# Patient Record
Sex: FEMALE
[De-identification: names, ages, dates, MRNs, and addresses within clinical notes are randomized per-mention and may not be internally consistent; named-entity substitution may affect disease eponyms.]

---

## 2021-07-10 ENCOUNTER — HOSPITAL ENCOUNTER (INPATIENT)
Dept: HOSPITAL 8 - 5SO | Age: 66
LOS: 1 days | Discharge: HOME | DRG: 247 | End: 2021-07-11
Attending: STUDENT IN AN ORGANIZED HEALTH CARE EDUCATION/TRAINING PROGRAM | Admitting: INTERNAL MEDICINE
Payer: COMMERCIAL

## 2021-07-10 VITALS — DIASTOLIC BLOOD PRESSURE: 76 MMHG | SYSTOLIC BLOOD PRESSURE: 113 MMHG

## 2021-07-10 VITALS — SYSTOLIC BLOOD PRESSURE: 118 MMHG | DIASTOLIC BLOOD PRESSURE: 73 MMHG

## 2021-07-10 VITALS — DIASTOLIC BLOOD PRESSURE: 74 MMHG | SYSTOLIC BLOOD PRESSURE: 118 MMHG

## 2021-07-10 VITALS — SYSTOLIC BLOOD PRESSURE: 162 MMHG | DIASTOLIC BLOOD PRESSURE: 99 MMHG

## 2021-07-10 VITALS — HEIGHT: 61 IN | BODY MASS INDEX: 34.96 KG/M2 | WEIGHT: 185.19 LBS

## 2021-07-10 DIAGNOSIS — Z82.49: ICD-10-CM

## 2021-07-10 DIAGNOSIS — I10: ICD-10-CM

## 2021-07-10 DIAGNOSIS — Z95.5: ICD-10-CM

## 2021-07-10 DIAGNOSIS — I25.10: ICD-10-CM

## 2021-07-10 DIAGNOSIS — R55: ICD-10-CM

## 2021-07-10 DIAGNOSIS — Z87.891: ICD-10-CM

## 2021-07-10 DIAGNOSIS — I21.4: Primary | ICD-10-CM

## 2021-07-10 DIAGNOSIS — R00.1: ICD-10-CM

## 2021-07-10 DIAGNOSIS — E66.9: ICD-10-CM

## 2021-07-10 DIAGNOSIS — E78.5: ICD-10-CM

## 2021-07-10 LAB
ANION GAP SERPL CALC-SCNC: 4 MMOL/L (ref 5–15)
BASOPHILS # BLD AUTO: 0 X10^3/UL (ref 0–0.1)
BASOPHILS NFR BLD AUTO: 0 % (ref 0–1)
CALCIUM SERPL-MCNC: 8.6 MG/DL (ref 8.5–10.1)
CHLORIDE SERPL-SCNC: 116 MMOL/L (ref 98–107)
CHOL/HDL RATIO: 3.5
CREAT SERPL-MCNC: 0.82 MG/DL (ref 0.55–1.02)
EOSINOPHIL # BLD AUTO: 0.1 X10^3/UL (ref 0–0.4)
EOSINOPHIL NFR BLD AUTO: 1 % (ref 1–7)
ERYTHROCYTE [DISTWIDTH] IN BLOOD BY AUTOMATED COUNT: 14.8 % (ref 9.6–15.2)
HDL CHOL %: 29 % (ref 28–40)
HDL CHOLESTEROL (DIRECT): 56 MG/DL (ref 40–60)
LDL CHOLESTEROL,CALCULATED: 113 MG/DL (ref 54–169)
LDLC/HDLC SERPL: 2 {RATIO} (ref 0.5–3)
LYMPHOCYTES # BLD AUTO: 2.1 X10^3/UL (ref 1–3.4)
LYMPHOCYTES NFR BLD AUTO: 22 % (ref 22–44)
MCH RBC QN AUTO: 29.2 PG (ref 27–34.8)
MCHC RBC AUTO-ENTMCNC: 33.4 G/DL (ref 32.4–35.8)
MONOCYTES # BLD AUTO: 0.8 X10^3/UL (ref 0.2–0.8)
MONOCYTES NFR BLD AUTO: 8 % (ref 2–9)
NEUTROPHILS # BLD AUTO: 6.7 X10^3/UL (ref 1.8–6.8)
NEUTROPHILS NFR BLD AUTO: 69 % (ref 42–75)
PLATELET # BLD AUTO: 278 X10^3/UL (ref 130–400)
PMV BLD AUTO: 8.5 FL (ref 7.4–10.4)
RBC # BLD AUTO: 4.75 X10^6/UL (ref 3.82–5.3)
TRIGL SERPL-MCNC: 125 MG/DL (ref 50–200)
TROPONIN I SERPL-MCNC: 5.16 NG/ML (ref 0–0.04)
TROPONIN I SERPL-MCNC: 7.56 NG/ML (ref 0–0.04)
TROPONIN I SERPL-MCNC: 7.58 NG/ML (ref 0–0.04)
VLDLC SERPL CALC-MCNC: 25 MG/DL (ref 0–25)

## 2021-07-10 PROCEDURE — 93306 TTE W/DOPPLER COMPLETE: CPT

## 2021-07-10 PROCEDURE — 99157 MOD SED OTHER PHYS/QHP EA: CPT

## 2021-07-10 PROCEDURE — 80048 BASIC METABOLIC PNL TOTAL CA: CPT

## 2021-07-10 PROCEDURE — 36415 COLL VENOUS BLD VENIPUNCTURE: CPT

## 2021-07-10 PROCEDURE — 4A023N7 MEASUREMENT OF CARDIAC SAMPLING AND PRESSURE, LEFT HEART, PERCUTANEOUS APPROACH: ICD-10-PCS | Performed by: INTERNAL MEDICINE

## 2021-07-10 PROCEDURE — C1887 CATHETER, GUIDING: HCPCS

## 2021-07-10 PROCEDURE — 027034Z DILATION OF CORONARY ARTERY, ONE ARTERY WITH DRUG-ELUTING INTRALUMINAL DEVICE, PERCUTANEOUS APPROACH: ICD-10-PCS | Performed by: INTERNAL MEDICINE

## 2021-07-10 PROCEDURE — B2151ZZ FLUOROSCOPY OF LEFT HEART USING LOW OSMOLAR CONTRAST: ICD-10-PCS | Performed by: INTERNAL MEDICINE

## 2021-07-10 PROCEDURE — C1725 CATH, TRANSLUMIN NON-LASER: HCPCS

## 2021-07-10 PROCEDURE — 93356 MYOCRD STRAIN IMG SPCKL TRCK: CPT

## 2021-07-10 PROCEDURE — 85025 COMPLETE CBC W/AUTO DIFF WBC: CPT

## 2021-07-10 PROCEDURE — C1769 GUIDE WIRE: HCPCS

## 2021-07-10 PROCEDURE — 83735 ASSAY OF MAGNESIUM: CPT

## 2021-07-10 PROCEDURE — 99156 MOD SED OTH PHYS/QHP 5/>YRS: CPT

## 2021-07-10 PROCEDURE — 85520 HEPARIN ASSAY: CPT

## 2021-07-10 PROCEDURE — C1874 STENT, COATED/COV W/DEL SYS: HCPCS

## 2021-07-10 PROCEDURE — C1894 INTRO/SHEATH, NON-LASER: HCPCS

## 2021-07-10 PROCEDURE — 80061 LIPID PANEL: CPT

## 2021-07-10 PROCEDURE — C9600 PERC DRUG-EL COR STENT SING: HCPCS

## 2021-07-10 PROCEDURE — 93458 L HRT ARTERY/VENTRICLE ANGIO: CPT

## 2021-07-10 PROCEDURE — 84484 ASSAY OF TROPONIN QUANT: CPT

## 2021-07-10 PROCEDURE — B2111ZZ FLUOROSCOPY OF MULTIPLE CORONARY ARTERIES USING LOW OSMOLAR CONTRAST: ICD-10-PCS | Performed by: INTERNAL MEDICINE

## 2021-07-10 RX ADMIN — SODIUM CHLORIDE SCH MLS/HR: 0.9 INJECTION, SOLUTION INTRAVENOUS at 10:00

## 2021-07-10 RX ADMIN — SODIUM CHLORIDE SCH MLS/HR: 0.9 INJECTION, SOLUTION INTRAVENOUS at 19:38

## 2021-07-10 RX ADMIN — TICAGRELOR SCH MG: 90 TABLET ORAL at 20:46

## 2021-07-10 RX ADMIN — CARVEDILOL SCH MG: 3.12 TABLET, FILM COATED ORAL at 09:04

## 2021-07-10 RX ADMIN — CARVEDILOL SCH MG: 3.12 TABLET, FILM COATED ORAL at 17:15

## 2021-07-10 RX ADMIN — ASPIRIN 81 MG SCH MG: 81 TABLET ORAL at 09:04

## 2021-07-10 RX ADMIN — SODIUM CHLORIDE SCH MLS/HR: 0.9 INJECTION, SOLUTION INTRAVENOUS at 13:00

## 2021-07-10 RX ADMIN — LISINOPRIL SCH MG: 10 TABLET ORAL at 09:04

## 2021-07-11 VITALS — SYSTOLIC BLOOD PRESSURE: 127 MMHG | DIASTOLIC BLOOD PRESSURE: 68 MMHG

## 2021-07-11 VITALS — SYSTOLIC BLOOD PRESSURE: 129 MMHG | DIASTOLIC BLOOD PRESSURE: 72 MMHG

## 2021-07-11 VITALS — DIASTOLIC BLOOD PRESSURE: 68 MMHG | SYSTOLIC BLOOD PRESSURE: 129 MMHG

## 2021-07-11 VITALS — SYSTOLIC BLOOD PRESSURE: 137 MMHG | DIASTOLIC BLOOD PRESSURE: 91 MMHG

## 2021-07-11 LAB
ANION GAP SERPL CALC-SCNC: 4 MMOL/L (ref 5–15)
BASOPHILS # BLD AUTO: 0 X10^3/UL (ref 0–0.1)
BASOPHILS NFR BLD AUTO: 1 % (ref 0–1)
CALCIUM SERPL-MCNC: 8.5 MG/DL (ref 8.5–10.1)
CHLORIDE SERPL-SCNC: 113 MMOL/L (ref 98–107)
CREAT SERPL-MCNC: 0.75 MG/DL (ref 0.55–1.02)
EOSINOPHIL # BLD AUTO: 0.3 X10^3/UL (ref 0–0.4)
EOSINOPHIL NFR BLD AUTO: 4 % (ref 1–7)
ERYTHROCYTE [DISTWIDTH] IN BLOOD BY AUTOMATED COUNT: 15 % (ref 9.6–15.2)
LYMPHOCYTES # BLD AUTO: 1.6 X10^3/UL (ref 1–3.4)
LYMPHOCYTES NFR BLD AUTO: 25 % (ref 22–44)
MCH RBC QN AUTO: 29.8 PG (ref 27–34.8)
MCHC RBC AUTO-ENTMCNC: 34.1 G/DL (ref 32.4–35.8)
MONOCYTES # BLD AUTO: 0.5 X10^3/UL (ref 0.2–0.8)
MONOCYTES NFR BLD AUTO: 8 % (ref 2–9)
NEUTROPHILS # BLD AUTO: 4 X10^3/UL (ref 1.8–6.8)
NEUTROPHILS NFR BLD AUTO: 62 % (ref 42–75)
PLATELET # BLD AUTO: 233 X10^3/UL (ref 130–400)
PMV BLD AUTO: 8.4 FL (ref 7.4–10.4)
RBC # BLD AUTO: 4.25 X10^6/UL (ref 3.82–5.3)

## 2021-07-11 RX ADMIN — SODIUM CHLORIDE SCH MLS/HR: 0.9 INJECTION, SOLUTION INTRAVENOUS at 04:10

## 2021-07-11 RX ADMIN — LISINOPRIL SCH MG: 10 TABLET ORAL at 09:35

## 2021-07-11 RX ADMIN — CARVEDILOL SCH MG: 3.12 TABLET, FILM COATED ORAL at 05:25

## 2021-07-11 RX ADMIN — TICAGRELOR SCH MG: 90 TABLET ORAL at 09:00

## 2021-07-11 RX ADMIN — ASPIRIN 81 MG SCH MG: 81 TABLET ORAL at 09:35

## 2021-07-11 RX ADMIN — SODIUM CHLORIDE SCH MLS/HR: 0.9 INJECTION, SOLUTION INTRAVENOUS at 13:00

## 2023-12-13 ENCOUNTER — HOSPITAL ENCOUNTER (INPATIENT)
Facility: MEDICAL CENTER | Age: 68
LOS: 1 days | DRG: 310 | End: 2023-12-14
Attending: EMERGENCY MEDICINE | Admitting: INTERNAL MEDICINE
Payer: MEDICARE

## 2023-12-13 ENCOUNTER — APPOINTMENT (OUTPATIENT)
Dept: CARDIOLOGY | Facility: MEDICAL CENTER | Age: 68
DRG: 310 | End: 2023-12-13
Payer: MEDICARE

## 2023-12-13 ENCOUNTER — APPOINTMENT (OUTPATIENT)
Dept: RADIOLOGY | Facility: MEDICAL CENTER | Age: 68
DRG: 310 | End: 2023-12-13
Attending: EMERGENCY MEDICINE
Payer: MEDICARE

## 2023-12-13 DIAGNOSIS — I10 HYPERTENSION, UNSPECIFIED TYPE: ICD-10-CM

## 2023-12-13 DIAGNOSIS — I49.5 SICK SINUS SYNDROME (HCC): Primary | ICD-10-CM

## 2023-12-13 DIAGNOSIS — R00.2 PALPITATIONS: ICD-10-CM

## 2023-12-13 PROBLEM — E78.5 HYPERLIPIDEMIA: Status: ACTIVE | Noted: 2023-12-13

## 2023-12-13 PROBLEM — R00.1 BRADYCARDIA: Status: ACTIVE | Noted: 2023-12-13

## 2023-12-13 LAB
ALBUMIN SERPL BCP-MCNC: 4.1 G/DL (ref 3.2–4.9)
ALBUMIN/GLOB SERPL: 1.3 G/DL
ALP SERPL-CCNC: 153 U/L (ref 30–99)
ALT SERPL-CCNC: 21 U/L (ref 2–50)
ANION GAP SERPL CALC-SCNC: 11 MMOL/L (ref 7–16)
AST SERPL-CCNC: 24 U/L (ref 12–45)
BASOPHILS # BLD AUTO: 0.4 % (ref 0–1.8)
BASOPHILS # BLD: 0.04 K/UL (ref 0–0.12)
BILIRUB SERPL-MCNC: 0.2 MG/DL (ref 0.1–1.5)
BUN SERPL-MCNC: 16 MG/DL (ref 8–22)
CALCIUM ALBUM COR SERPL-MCNC: 9.4 MG/DL (ref 8.5–10.5)
CALCIUM SERPL-MCNC: 9.5 MG/DL (ref 8.5–10.5)
CHLORIDE SERPL-SCNC: 108 MMOL/L (ref 96–112)
CHOLEST SERPL-MCNC: 184 MG/DL (ref 100–199)
CO2 SERPL-SCNC: 25 MMOL/L (ref 20–33)
CREAT SERPL-MCNC: 0.76 MG/DL (ref 0.5–1.4)
EKG IMPRESSION: NORMAL
EKG IMPRESSION: NORMAL
EOSINOPHIL # BLD AUTO: 0.24 K/UL (ref 0–0.51)
EOSINOPHIL NFR BLD: 2.5 % (ref 0–6.9)
ERYTHROCYTE [DISTWIDTH] IN BLOOD BY AUTOMATED COUNT: 43.4 FL (ref 35.9–50)
GFR SERPLBLD CREATININE-BSD FMLA CKD-EPI: 86 ML/MIN/1.73 M 2
GLOBULIN SER CALC-MCNC: 3.1 G/DL (ref 1.9–3.5)
GLUCOSE SERPL-MCNC: 123 MG/DL (ref 65–99)
HCT VFR BLD AUTO: 45.3 % (ref 37–47)
HDLC SERPL-MCNC: 69 MG/DL
HGB BLD-MCNC: 14.8 G/DL (ref 12–16)
IMM GRANULOCYTES # BLD AUTO: 0.04 K/UL (ref 0–0.11)
IMM GRANULOCYTES NFR BLD AUTO: 0.4 % (ref 0–0.9)
LDLC SERPL CALC-MCNC: 100 MG/DL
LV EJECT FRACT  99904: 65
LV EJECT FRACT MOD 2C 99903: 72.61
LV EJECT FRACT MOD 4C 99902: 44.71
LV EJECT FRACT MOD BP 99901: 59.71
LYMPHOCYTES # BLD AUTO: 2.29 K/UL (ref 1–4.8)
LYMPHOCYTES NFR BLD: 24.1 % (ref 22–41)
MCH RBC QN AUTO: 29.1 PG (ref 27–33)
MCHC RBC AUTO-ENTMCNC: 32.7 G/DL (ref 32.2–35.5)
MCV RBC AUTO: 89.2 FL (ref 81.4–97.8)
MONOCYTES # BLD AUTO: 0.54 K/UL (ref 0–0.85)
MONOCYTES NFR BLD AUTO: 5.7 % (ref 0–13.4)
NEUTROPHILS # BLD AUTO: 6.35 K/UL (ref 1.82–7.42)
NEUTROPHILS NFR BLD: 66.9 % (ref 44–72)
NRBC # BLD AUTO: 0 K/UL
NRBC BLD-RTO: 0 /100 WBC (ref 0–0.2)
NT-PROBNP SERPL IA-MCNC: 204 PG/ML (ref 0–125)
PLATELET # BLD AUTO: 301 K/UL (ref 164–446)
PMV BLD AUTO: 9.8 FL (ref 9–12.9)
POTASSIUM SERPL-SCNC: 4.4 MMOL/L (ref 3.6–5.5)
PROT SERPL-MCNC: 7.2 G/DL (ref 6–8.2)
RBC # BLD AUTO: 5.08 M/UL (ref 4.2–5.4)
SODIUM SERPL-SCNC: 144 MMOL/L (ref 135–145)
TRIGL SERPL-MCNC: 74 MG/DL (ref 0–149)
TROPONIN T SERPL-MCNC: 10 NG/L (ref 6–19)
TROPONIN T SERPL-MCNC: 8 NG/L (ref 6–19)
TSH SERPL DL<=0.005 MIU/L-ACNC: 2.64 UIU/ML (ref 0.38–5.33)
WBC # BLD AUTO: 9.5 K/UL (ref 4.8–10.8)

## 2023-12-13 PROCEDURE — 700102 HCHG RX REV CODE 250 W/ 637 OVERRIDE(OP): Performed by: INTERNAL MEDICINE

## 2023-12-13 PROCEDURE — 93306 TTE W/DOPPLER COMPLETE: CPT | Mod: 26 | Performed by: INTERNAL MEDICINE

## 2023-12-13 PROCEDURE — 83880 ASSAY OF NATRIURETIC PEPTIDE: CPT

## 2023-12-13 PROCEDURE — 85025 COMPLETE CBC W/AUTO DIFF WBC: CPT

## 2023-12-13 PROCEDURE — A9270 NON-COVERED ITEM OR SERVICE: HCPCS | Performed by: STUDENT IN AN ORGANIZED HEALTH CARE EDUCATION/TRAINING PROGRAM

## 2023-12-13 PROCEDURE — 36415 COLL VENOUS BLD VENIPUNCTURE: CPT

## 2023-12-13 PROCEDURE — 93005 ELECTROCARDIOGRAM TRACING: CPT | Performed by: EMERGENCY MEDICINE

## 2023-12-13 PROCEDURE — 99285 EMERGENCY DEPT VISIT HI MDM: CPT

## 2023-12-13 PROCEDURE — 700102 HCHG RX REV CODE 250 W/ 637 OVERRIDE(OP): Performed by: STUDENT IN AN ORGANIZED HEALTH CARE EDUCATION/TRAINING PROGRAM

## 2023-12-13 PROCEDURE — 99223 1ST HOSP IP/OBS HIGH 75: CPT | Mod: AI | Performed by: STUDENT IN AN ORGANIZED HEALTH CARE EDUCATION/TRAINING PROGRAM

## 2023-12-13 PROCEDURE — 770020 HCHG ROOM/CARE - TELE (206)

## 2023-12-13 PROCEDURE — 71045 X-RAY EXAM CHEST 1 VIEW: CPT

## 2023-12-13 PROCEDURE — 700111 HCHG RX REV CODE 636 W/ 250 OVERRIDE (IP): Mod: JZ | Performed by: STUDENT IN AN ORGANIZED HEALTH CARE EDUCATION/TRAINING PROGRAM

## 2023-12-13 PROCEDURE — 93005 ELECTROCARDIOGRAM TRACING: CPT

## 2023-12-13 PROCEDURE — 80061 LIPID PANEL: CPT

## 2023-12-13 PROCEDURE — 99222 1ST HOSP IP/OBS MODERATE 55: CPT | Mod: GC | Performed by: INTERNAL MEDICINE

## 2023-12-13 PROCEDURE — 80053 COMPREHEN METABOLIC PANEL: CPT

## 2023-12-13 PROCEDURE — 93306 TTE W/DOPPLER COMPLETE: CPT

## 2023-12-13 PROCEDURE — A9270 NON-COVERED ITEM OR SERVICE: HCPCS | Performed by: INTERNAL MEDICINE

## 2023-12-13 PROCEDURE — 96372 THER/PROPH/DIAG INJ SC/IM: CPT

## 2023-12-13 PROCEDURE — 84443 ASSAY THYROID STIM HORMONE: CPT

## 2023-12-13 PROCEDURE — 94760 N-INVAS EAR/PLS OXIMETRY 1: CPT

## 2023-12-13 PROCEDURE — 84484 ASSAY OF TROPONIN QUANT: CPT

## 2023-12-13 RX ORDER — OMEGA-3 FATTY ACIDS/FISH OIL 300-1000MG
1 CAPSULE ORAL DAILY
COMMUNITY

## 2023-12-13 RX ORDER — AMPICILLIN TRIHYDRATE 500 MG
1000 CAPSULE ORAL DAILY
COMMUNITY

## 2023-12-13 RX ORDER — CARVEDILOL 3.12 MG/1
3.12 TABLET ORAL 2 TIMES DAILY WITH MEALS
Status: DISCONTINUED | OUTPATIENT
Start: 2023-12-13 | End: 2023-12-13

## 2023-12-13 RX ORDER — LISINOPRIL 5 MG/1
5 TABLET ORAL
Status: DISCONTINUED | OUTPATIENT
Start: 2023-12-13 | End: 2023-12-14 | Stop reason: HOSPADM

## 2023-12-13 RX ORDER — LISINOPRIL 5 MG/1
5 TABLET ORAL EVERY EVENING
Status: ON HOLD | COMMUNITY
Start: 2023-07-20 | End: 2023-12-14

## 2023-12-13 RX ORDER — POLYETHYLENE GLYCOL 3350 17 G/17G
1 POWDER, FOR SOLUTION ORAL
Status: DISCONTINUED | OUTPATIENT
Start: 2023-12-13 | End: 2023-12-14 | Stop reason: HOSPADM

## 2023-12-13 RX ORDER — BISACODYL 10 MG
10 SUPPOSITORY, RECTAL RECTAL
Status: DISCONTINUED | OUTPATIENT
Start: 2023-12-13 | End: 2023-12-14 | Stop reason: HOSPADM

## 2023-12-13 RX ORDER — ATORVASTATIN CALCIUM 80 MG/1
80 TABLET, FILM COATED ORAL EVERY EVENING
COMMUNITY

## 2023-12-13 RX ORDER — ASPIRIN 81 MG/1
81 TABLET ORAL EVERY EVENING
COMMUNITY

## 2023-12-13 RX ORDER — METOPROLOL SUCCINATE 50 MG/1
50 TABLET, EXTENDED RELEASE ORAL 2 TIMES DAILY
Status: DISCONTINUED | OUTPATIENT
Start: 2023-12-14 | End: 2023-12-14 | Stop reason: HOSPADM

## 2023-12-13 RX ORDER — METOPROLOL SUCCINATE 50 MG/1
50 TABLET, EXTENDED RELEASE ORAL
Status: DISCONTINUED | OUTPATIENT
Start: 2023-12-13 | End: 2023-12-13

## 2023-12-13 RX ORDER — UBIDECARENONE 100 MG
100 CAPSULE ORAL DAILY
COMMUNITY

## 2023-12-13 RX ORDER — ATORVASTATIN CALCIUM 80 MG/1
80 TABLET, FILM COATED ORAL EVERY EVENING
Status: DISCONTINUED | OUTPATIENT
Start: 2023-12-13 | End: 2023-12-14 | Stop reason: HOSPADM

## 2023-12-13 RX ORDER — AMOXICILLIN 250 MG
2 CAPSULE ORAL 2 TIMES DAILY
Status: DISCONTINUED | OUTPATIENT
Start: 2023-12-13 | End: 2023-12-14 | Stop reason: HOSPADM

## 2023-12-13 RX ORDER — CARVEDILOL 3.12 MG/1
3.12 TABLET ORAL 2 TIMES DAILY WITH MEALS
Status: ON HOLD | COMMUNITY
Start: 2023-07-20 | End: 2023-12-14

## 2023-12-13 RX ORDER — ENOXAPARIN SODIUM 100 MG/ML
40 INJECTION SUBCUTANEOUS DAILY
Status: DISCONTINUED | OUTPATIENT
Start: 2023-12-13 | End: 2023-12-14 | Stop reason: HOSPADM

## 2023-12-13 RX ORDER — ACETAMINOPHEN 325 MG/1
650 TABLET ORAL EVERY 6 HOURS PRN
Status: DISCONTINUED | OUTPATIENT
Start: 2023-12-13 | End: 2023-12-14 | Stop reason: HOSPADM

## 2023-12-13 RX ORDER — ASPIRIN 81 MG/1
81 TABLET ORAL DAILY
Status: DISCONTINUED | OUTPATIENT
Start: 2023-12-13 | End: 2023-12-14 | Stop reason: HOSPADM

## 2023-12-13 RX ADMIN — ATORVASTATIN CALCIUM 80 MG: 80 TABLET, FILM COATED ORAL at 17:24

## 2023-12-13 RX ADMIN — ENOXAPARIN SODIUM 40 MG: 100 INJECTION SUBCUTANEOUS at 17:24

## 2023-12-13 RX ADMIN — LISINOPRIL 5 MG: 10 TABLET ORAL at 06:39

## 2023-12-13 RX ADMIN — ACETAMINOPHEN 650 MG: 325 TABLET, FILM COATED ORAL at 21:11

## 2023-12-13 RX ADMIN — METOPROLOL SUCCINATE 50 MG: 50 TABLET, EXTENDED RELEASE ORAL at 12:50

## 2023-12-13 RX ADMIN — ASPIRIN 81 MG: 81 TABLET, COATED ORAL at 17:24

## 2023-12-13 RX ADMIN — CARVEDILOL 3.12 MG: 3.12 TABLET, FILM COATED ORAL at 07:22

## 2023-12-13 ASSESSMENT — COGNITIVE AND FUNCTIONAL STATUS - GENERAL
DAILY ACTIVITIY SCORE: 24
SUGGESTED CMS G CODE MODIFIER DAILY ACTIVITY: CH
SUGGESTED CMS G CODE MODIFIER MOBILITY: CH
MOBILITY SCORE: 24

## 2023-12-13 ASSESSMENT — ENCOUNTER SYMPTOMS
PHOTOPHOBIA: 0
SENSORY CHANGE: 0
CHILLS: 0
DIARRHEA: 0
HEADACHES: 0
SPEECH CHANGE: 0
NAUSEA: 0
ORTHOPNEA: 0
MYALGIAS: 0
SORE THROAT: 0
VOMITING: 0
LOSS OF CONSCIOUSNESS: 0
PALPITATIONS: 1
SPUTUM PRODUCTION: 0
DEPRESSION: 0
TREMORS: 0
FLANK PAIN: 0
DIZZINESS: 0
HALLUCINATIONS: 0
NERVOUS/ANXIOUS: 0
FEVER: 0
FALLS: 0
COUGH: 0
PND: 0
EYE PAIN: 0
ABDOMINAL PAIN: 0
NECK PAIN: 0
CLAUDICATION: 0
DOUBLE VISION: 0
BACK PAIN: 0
DIZZINESS: 1
HEMOPTYSIS: 0
WEAKNESS: 0
BLURRED VISION: 0
TINGLING: 0
SHORTNESS OF BREATH: 0
ABDOMINAL PAIN: 1
WHEEZING: 0

## 2023-12-13 ASSESSMENT — LIFESTYLE VARIABLES
ON A TYPICAL DAY WHEN YOU DRINK ALCOHOL HOW MANY DRINKS DO YOU HAVE: 0
SUBSTANCE_ABUSE: 0
HAVE PEOPLE ANNOYED YOU BY CRITICIZING YOUR DRINKING: NO
TOTAL SCORE: 0
AVERAGE NUMBER OF DAYS PER WEEK YOU HAVE A DRINK CONTAINING ALCOHOL: 0
ALCOHOL_USE: YES
HAVE YOU EVER FELT YOU SHOULD CUT DOWN ON YOUR DRINKING: NO
TOTAL SCORE: 0
EVER FELT BAD OR GUILTY ABOUT YOUR DRINKING: NO
TOTAL SCORE: 0
DOES PATIENT WANT TO STOP DRINKING: NO
EVER HAD A DRINK FIRST THING IN THE MORNING TO STEADY YOUR NERVES TO GET RID OF A HANGOVER: NO
HOW MANY TIMES IN THE PAST YEAR HAVE YOU HAD 5 OR MORE DRINKS IN A DAY: 0
CONSUMPTION TOTAL: NEGATIVE

## 2023-12-13 ASSESSMENT — PATIENT HEALTH QUESTIONNAIRE - PHQ9
SUM OF ALL RESPONSES TO PHQ9 QUESTIONS 1 AND 2: 0
1. LITTLE INTEREST OR PLEASURE IN DOING THINGS: NOT AT ALL
2. FEELING DOWN, DEPRESSED, IRRITABLE, OR HOPELESS: NOT AT ALL

## 2023-12-13 ASSESSMENT — PAIN DESCRIPTION - PAIN TYPE: TYPE: ACUTE PAIN

## 2023-12-13 ASSESSMENT — FIBROSIS 4 INDEX: FIB4 SCORE: 1.15

## 2023-12-13 NOTE — ED NOTES
Pt medicated per MAR  Bedside report to WILSON De La Fuente  Pt resting in bed with call light within reach

## 2023-12-13 NOTE — ED NOTES
Rounded on patient. Patient resting in bed. Denies further needs at this time. Call light within reach.

## 2023-12-13 NOTE — ED NOTES
Troponin drawn and sent to lab. Call light within reach. Patient denies further needs at this time.

## 2023-12-13 NOTE — ED NOTES
Bedside report given to WILSON Borjas. Patient care transferred. 98% on RA. Call light within reach. Patient denies further needs at this time.

## 2023-12-13 NOTE — ASSESSMENT & PLAN NOTE
Reported episodes of racing heart and palpitations as well as some lightheadedness and dizziness.  Outside facility concerned for tachy-jaxson syndrome, transferred here for cardiology evaluation. She was reported there to have heart rate fluctuating between 30s and 150s, did get 2g magnesium IV.     Heart rate here primarily 70s, with some variations down to 39 and up to 121.   EKG on arrival was sinus rhythm. No prior history of afib.  Troponin within normal limits. TSH within normal limits.    Cardiology consult requested this morning. Appreciate guidance on further testing. Added echocardiogram.

## 2023-12-13 NOTE — PROGRESS NOTES
"Hospital Medicine Daily Progress Note    Date of Service  12/13/2023    Chief Complaint  Grace Reid is a 68 y.o. female admitted 12/13/2023 with palpitations, arrhythmia    Hospital Course  Grace Reid is a 68 y.o. female w/ PMH of HTN, HLD, GERD, CAD s/p stent placement two years ago after a MI, who presented 12/13/2023 with as a transfer from outside facility for arrhythmia.    Patient initially presented to the outside facility complaining of acute onset palpitations.  According to chart review, patient was found to be SVT and was given 2 mg of magnesium, and she converted to sinus tachycardia.  At the outside facility, patient was found to be bradycardic in the 30s and tachycardic greater than 120s. Outside facility concerned for tachy-jaxson syndrome, and as a result she was transferred here for cardiology evaluation.      Patient states that she is currently experiencing some palpitations and racing heart rate, lightheadedness, dizziness, and states that she has mild discomfort substernally/epigastric when her heart races. States the pain does not radiate anywhere. Denies syncope, however she does have a few episodes where she is unsure if she \"nodded off\" or not. No prior history of atrial fibrillation. EKG in ED was sinus rhythm.    Interval Problem Update  12/13: Peak heart rates here have improved to 120. Between 0730 and 0830 she did have 3 episodes of bradycardia, heart rates 39-48. She is unsure if she had any symptoms with these events. Notified cardiology of her arrival and requested consult to guide further testing. Echo pending. Troponins and TSH WNL. Will discuss with cardiology and start a diet if no procedure planned.       I have discussed this patient's plan of care and discharge plan at IDT rounds today with Case Management, Nursing, Nursing leadership, and other members of the IDT team.    Consultants/Specialty  cardiology    Code Status  Full Code    Disposition  The patient is " not medically cleared for discharge to home or a post-acute facility.    I have placed the appropriate orders for post-discharge needs.    Review of Systems  Review of Systems   Constitutional:  Negative for chills, fever and malaise/fatigue.   HENT:  Negative for congestion and sore throat.    Respiratory:  Negative for cough and shortness of breath.    Cardiovascular:  Positive for palpitations. Negative for chest pain.   Gastrointestinal:  Positive for abdominal pain. Negative for nausea and vomiting.   Genitourinary:  Negative for dysuria and flank pain.   Musculoskeletal:  Negative for falls and myalgias.   Neurological:  Positive for dizziness. Negative for sensory change, loss of consciousness, weakness and headaches.        Physical Exam  Temp:  [36.5 °C (97.7 °F)] 36.5 °C (97.7 °F)  Pulse:  [] 61  Resp:  [12-20] 15  BP: (133-185)/() 138/74  SpO2:  [93 %-98 %] 96 %    Physical Exam  Constitutional:       General: She is not in acute distress.     Appearance: Normal appearance. She is obese. She is not ill-appearing.   HENT:      Head: Normocephalic.      Mouth/Throat:      Mouth: Mucous membranes are moist.      Pharynx: Oropharynx is clear.   Eyes:      Extraocular Movements: Extraocular movements intact.      Conjunctiva/sclera: Conjunctivae normal.   Cardiovascular:      Rate and Rhythm: Normal rate and regular rhythm.      Pulses: Normal pulses.      Heart sounds: Normal heart sounds. No murmur heard.     No friction rub. No gallop.   Pulmonary:      Effort: Pulmonary effort is normal. No respiratory distress.      Breath sounds: No stridor. No wheezing, rhonchi or rales.   Chest:      Chest wall: No tenderness.   Abdominal:      General: Abdomen is flat. There is no distension.      Palpations: Abdomen is soft. There is no mass.      Tenderness: There is no abdominal tenderness. There is no guarding or rebound.      Hernia: No hernia is present.   Musculoskeletal:         General: No  swelling, tenderness, deformity or signs of injury.      Right lower leg: No edema.      Left lower leg: No edema.      Comments:      Skin:     General: Skin is warm and dry.      Capillary Refill: Capillary refill takes less than 2 seconds.      Coloration: Skin is not jaundiced or pale.      Findings: No bruising, erythema, lesion or rash.   Neurological:      General: No focal deficit present.      Mental Status: She is alert and oriented to person, place, and time. Mental status is at baseline.      Cranial Nerves: No cranial nerve deficit.      Sensory: No sensory deficit.      Motor: No weakness.      Coordination: Coordination normal.   Psychiatric:         Mood and Affect: Mood normal.         Behavior: Behavior normal.         Fluids  No intake or output data in the 24 hours ending 12/13/23 1549    Laboratory  Recent Labs     12/13/23  0231   WBC 9.5   RBC 5.08   HEMOGLOBIN 14.8   HEMATOCRIT 45.3   MCV 89.2   MCH 29.1   MCHC 32.7   RDW 43.4   PLATELETCT 301   MPV 9.8     Recent Labs     12/13/23  0231   SODIUM 144   POTASSIUM 4.4   CHLORIDE 108   CO2 25   GLUCOSE 123*   BUN 16   CREATININE 0.76   CALCIUM 9.5             Recent Labs     12/13/23  0231   TRIGLYCERIDE 74   HDL 69   *       Imaging  EC-ECHOCARDIOGRAM COMPLETE W/O CONT   Final Result      DX-CHEST-PORTABLE (1 VIEW)   Final Result         1.  No focal infiltrates.           Assessment/Plan  * Bradycardia- (present on admission)  Assessment & Plan  Reported episodes of racing heart and palpitations as well as some lightheadedness and dizziness.  Outside facility concerned for tachy-jaxson syndrome, transferred here for cardiology evaluation. She was reported there to have heart rate fluctuating between 30s and 150s, did get 2g magnesium IV.     Heart rate here primarily 70s, with some variations down to 39 and up to 121.   EKG on arrival was sinus rhythm. No prior history of afib.  Troponin within normal limits. TSH within normal  limits.    Cardiology consult requested this morning. Appreciate guidance on further testing. Added echocardiogram.    Hyperlipidemia  Assessment & Plan  Continue atorvastatin     Primary hypertension  Assessment & Plan  Continue lisinopril, hold coreg this morning pending cardiology recommendations       VTE prophylaxis:    enoxaparin ppx    I have performed a physical exam and reviewed and updated ROS and Plan today (12/13/2023). In review of yesterday's note (12/12/2023), there are no changes except as documented above.

## 2023-12-13 NOTE — ED NOTES
Bedside report received from WILSON Thornton. Assumed care of patient. Pt assessed, AAO x 4. Patient aware of POC. Patient ambulated to bathroom with steady gait. Call light within reach.

## 2023-12-13 NOTE — ED NOTES
Med Rec complete per patient   Allergies reviewed  Antibiotics in the past 30 days:no  Anticoagulant in past 14 days:no  Pharmacy patient utilizes:[facility] in [place], [place]    Pt states she forgets to take her vitamins and unable to recall when she last took her doses    Pt states she used OTC monistat for 2 days only

## 2023-12-13 NOTE — H&P
Hospital Medicine History & Physical Note    Date of Service  12/13/2023    Primary Care Physician  No primary care provider on file.        Code Status  Full Code    Chief Complaint  Chief Complaint   Patient presents with    Palpitations     Pt transferred from [facility]. Pt had heart palpitations yesterday at 2230. Pt found to be in SVT. Pt received 2mg of Magnesium and self converted to sinus tachycardia.        History of Presenting Illness  Grace Reid is a 68 y.o. female w/ PMH of HTN, HLD, CAD s/p stent placement two years ago after a MI, who presented 12/13/2023 with as a transfer from outside facility for tachybradycardia syndrome.  Patient initially presented to the outside facility complaining of acute onset palpitations.  According to chart review, patient was found to be SVT and was given 2 mg of magnesium, and she converted to sinus tachycardia.  At the outside facility, patient was found to be bradycardic in the 30s and tachycardic greater than 120s.  As result she was transferred here for cardiology evaluation.  Patient states that she is currently experiencing some palpitations, and states that she has mild chest discomfort substernally.  States that it does not radiate anywhere.  She denies any dizziness or lightheadedness.     I discussed the plan of care with patient.    Review of Systems  Review of Systems   Constitutional:  Negative for chills and fever.   HENT:  Negative for congestion, ear discharge, ear pain, nosebleeds and tinnitus.    Eyes:  Negative for blurred vision, double vision, photophobia and pain.   Respiratory:  Negative for cough, hemoptysis, sputum production, shortness of breath and wheezing.    Cardiovascular:  Positive for palpitations. Negative for chest pain, orthopnea, claudication, leg swelling and PND.   Gastrointestinal:  Negative for abdominal pain, diarrhea, nausea and vomiting.   Genitourinary:  Negative for dysuria, frequency, hematuria and urgency.    Musculoskeletal:  Negative for back pain and neck pain.   Neurological:  Negative for dizziness, tingling, tremors, sensory change, speech change and headaches.   Psychiatric/Behavioral:  Negative for depression, hallucinations, substance abuse and suicidal ideas. The patient is not nervous/anxious.        Past Medical History   has no past medical history on file.    Surgical History   has no past surgical history on file.     Family History  family history is not on file.   Family history reviewed with patient. There is no family history that is pertinent to the chief complaint.     Social History       Allergies  No Known Allergies    Medications  None       Physical Exam  Temp:  [36.5 °C (97.7 °F)] 36.5 °C (97.7 °F)  Pulse:  [] 68  Resp:  [14-16] 15  BP: (171)/(92) 171/92  SpO2:  [95 %-97 %] 96 %  Blood Pressure : (!) 171/92   Temperature: 36.5 °C (97.7 °F)   Pulse: 68   Respiration: 15   Pulse Oximetry: 96 %       Physical Exam  Constitutional:       General: She is not in acute distress.     Appearance: Normal appearance. She is normal weight. She is not ill-appearing, toxic-appearing or diaphoretic.   HENT:      Head: Normocephalic and atraumatic.      Nose: Nose normal.      Mouth/Throat:      Mouth: Mucous membranes are moist.   Eyes:      Extraocular Movements: Extraocular movements intact.      Pupils: Pupils are equal, round, and reactive to light.   Cardiovascular:      Rate and Rhythm: Normal rate and regular rhythm.      Pulses: Normal pulses.      Heart sounds: Normal heart sounds. No murmur heard.     No friction rub. No gallop.   Pulmonary:      Effort: Pulmonary effort is normal. No respiratory distress.      Breath sounds: No stridor. No wheezing, rhonchi or rales.   Chest:      Chest wall: No tenderness.   Abdominal:      General: Abdomen is flat. There is no distension.      Palpations: Abdomen is soft. There is no mass.      Tenderness: There is no abdominal tenderness. There is no  guarding or rebound.      Hernia: No hernia is present.   Musculoskeletal:         General: No swelling, tenderness, deformity or signs of injury.      Right lower leg: No edema.      Left lower leg: No edema.      Comments:      Skin:     General: Skin is warm and dry.      Capillary Refill: Capillary refill takes less than 2 seconds.      Coloration: Skin is not jaundiced or pale.      Findings: No bruising, erythema, lesion or rash.   Neurological:      General: No focal deficit present.      Mental Status: She is alert and oriented to person, place, and time. Mental status is at baseline.      Cranial Nerves: No cranial nerve deficit.      Sensory: No sensory deficit.      Motor: No weakness.      Coordination: Coordination normal.   Psychiatric:         Mood and Affect: Mood normal.         Behavior: Behavior normal.         Laboratory:  Recent Labs     12/13/23 0231   WBC 9.5   RBC 5.08   HEMOGLOBIN 14.8   HEMATOCRIT 45.3   MCV 89.2   MCH 29.1   MCHC 32.7   RDW 43.4   PLATELETCT 301   MPV 9.8     Recent Labs     12/13/23 0231   SODIUM 144   POTASSIUM 4.4   CHLORIDE 108   CO2 25   GLUCOSE 123*   BUN 16   CREATININE 0.76   CALCIUM 9.5     Recent Labs     12/13/23 0231   ALTSGPT 21   ASTSGOT 24   ALKPHOSPHAT 153*   TBILIRUBIN 0.2   GLUCOSE 123*         Recent Labs     12/13/23 0231   NTPROBNP 204*         Recent Labs     12/13/23 0231   TROPONINT 10       Imaging:  DX-CHEST-PORTABLE (1 VIEW)   Final Result         1.  No focal infiltrates.          X-Ray:  I have personally reviewed the images and compared with prior images.  EKG:  I have personally reviewed the images and compared with prior images.    Assessment/Plan:  Justification for Admission Status  I anticipate this patient will require at least two midnights for appropriate medical management, necessitating inpatient admission because tachybardy syndrome    Patient will need a Telemetry bed on MEDICAL service .  The need is secondary to tachybrady  syndrome.    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  She was found to be bradycardic in the 30s, but would become tachycardic rater than 120s.  Patient currently stable.  Heart rate fluctuates between .  Troponin done at outside hospital was unremarkable.  Follow-up repeat troponin levels.  Cardiology consult in the morning    Hyperlipidemia  Assessment & Plan  Continue atorvastatin     Primary hypertension  Assessment & Plan  Continue with Coreg and lisinopril        VTE prophylaxis: enoxaparin ppx

## 2023-12-13 NOTE — ED PROVIDER NOTES
ED Provider Note    Scribed for Jules Dooley by Abi Reyes. 12/13/2023  2:21 AM    Primary care provider: No primary care provider noted.  Means of arrival: EMS  History obtained from: Patient  History limited by: None    CHIEF COMPLAINT  Chief Complaint   Patient presents with    Palpitations     Pt transferred from [facility]. Pt had heart palpitations yesterday at 2230. Pt found to be in SVT. Pt received 2mg of Magnesium and self converted to sinus tachycardia.        EXTERNAL RECORDS REVIEWED  Other The patient was sent here as a transfer from [facility] for palpitations and was found to be in SVT.    HPI/ROS  LIMITATION TO HISTORY   Select: : None    HPI  Grace Reid is a 68 y.o. female who presents to the Emergency Department via EMS as a transfer from [facility] for evaluation of palpitations onset 4 hours ago. She describes that she had just gotten home from meeting with some friends, sat down to watch television and felt that she was having severe palpitations. She reports that her FitBit altered her that it detected atrial fibrillation and recommended she go get evaluated. At [facility], the patient was found to be in supraventricular tachycardia. Prior to arrival, the patient received 2 mg magnesium and self converted to sinus tachycardia. The patient states she also has mild ankle swelling, but denies any chest pain or shortness of breath. The patient notes that when she got her toes done, they had noticed some mild swelling to her ankles. She does not she might be dehydrated, as she thinks she did not drink enough water today, but denies the consumption of alcohol. The patient also mentions that she has been experiencing heart burn for a while now and has an appointment scheduled. The patient has history of a heart attack about 2 years ago and had a stent placed.     REVIEW OF SYSTEMS  As above, all other systems reviewed and are negative.   See HPI for further  details.     PAST MEDICAL HISTORY   The patient has history of a heart attack 2 years ago and had a stent placed.     SURGICAL HISTORY  patient denies any surgical history    SOCIAL HISTORY      Social History     Substance and Sexual Activity   Drug Use Not on file     FAMILY HISTORY  No family history noted.    CURRENT MEDICATIONS  Home Medications       Reviewed by Marquez Coburn (Pharmacy Tech) on 12/13/23 at 0330  Med List Status: Complete     Medication Last Dose Status   aspirin 81 MG EC tablet 12/12/2023 Active   atorvastatin (LIPITOR) 80 MG tablet 12/11/2023 Active   carvedilol (COREG) 3.125 MG Tab 12/12/2023 Active   Cholecalciferol (D 1000) 1000 UNIT Cap UNK Active   Coenzyme Q10 100 MG Cap UNK Active   lisinopril (PRINIVIL) 5 MG Tab 12/12/2023 Active   Miscellaneous Vaginal Products ([facility]) 12/11/2023 Active   multivitamin Tab UNK Active   Non Formulary Request UNK Active   Non Formulary Request UNK Active   Omega 3 1000 MG Cap UNK Active                  ALLERGIES  No Known Allergies    PHYSICAL EXAM    VITAL SIGNS:   Vitals:    12/13/23 0220 12/13/23 0230 12/13/23 0231 12/13/23 0232   BP: (!) 171/92      Pulse: (!) 121 (!) 115 (!) 113 68   Resp: 16 15 14 15   Temp: 36.5 °C (97.7 °F)      TempSrc: Temporal      SpO2: 95% 96% 97% 96%   Weight:       Height:         Vitals: My interpretation: hypertensive, tachycardic, afebrile, not hypoxic    Reinterpretation of vitals: Improved    Cardiac Monitor Interpretation: The cardiac monitor revealed normal Sinus Rhythm with tachycardia as interpreted by me. The cardiac monitor was ordered secondary to the patient's history of tachycardia and to monitor for dysrhythmia and/or tachycardia.    PE:   Gen: sitting comfortably, speaking clearly, appears in no acute distress   ENT: Mucous membranes moist, posterior pharynx clear, uvula midline, nares patent bilaterally  Neck: Supple, FROM  Pulmonary: Lungs are clear to auscultation bilaterally. No  tachypnea  CV:  hypertensive, tachycardic, no murmur appreciated, pulses 2+ in both upper and lower extremities  Abdomen: soft, NT/ND; no rebound/guarding  : no CVA or suprapubic tenderness   Neuro: A&Ox4 (person, place, time, situation), speech fluent, gait steady, no focal deficits appreciated  Skin: No rash or lesions.  No pallor or jaundice.  No cyanosis.  Warm and dry.     DIAGNOSTIC STUDIES / PROCEDURES    LABS  Results for orders placed or performed during the hospital encounter of 12/13/23   CBC WITH DIFFERENTIAL   Result Value Ref Range    WBC 9.5 4.8 - 10.8 K/uL    RBC 5.08 4.20 - 5.40 M/uL    Hemoglobin 14.8 12.0 - 16.0 g/dL    Hematocrit 45.3 37.0 - 47.0 %    MCV 89.2 81.4 - 97.8 fL    MCH 29.1 27.0 - 33.0 pg    MCHC 32.7 32.2 - 35.5 g/dL    RDW 43.4 35.9 - 50.0 fL    Platelet Count 301 164 - 446 K/uL    MPV 9.8 9.0 - 12.9 fL    Neutrophils-Polys 66.90 44.00 - 72.00 %    Lymphocytes 24.10 22.00 - 41.00 %    Monocytes 5.70 0.00 - 13.40 %    Eosinophils 2.50 0.00 - 6.90 %    Basophils 0.40 0.00 - 1.80 %    Immature Granulocytes 0.40 0.00 - 0.90 %    Nucleated RBC 0.00 0.00 - 0.20 /100 WBC    Neutrophils (Absolute) 6.35 1.82 - 7.42 K/uL    Lymphs (Absolute) 2.29 1.00 - 4.80 K/uL    Monos (Absolute) 0.54 0.00 - 0.85 K/uL    Eos (Absolute) 0.24 0.00 - 0.51 K/uL    Baso (Absolute) 0.04 0.00 - 0.12 K/uL    Immature Granulocytes (abs) 0.04 0.00 - 0.11 K/uL    NRBC (Absolute) 0.00 K/uL   COMP METABOLIC PANEL   Result Value Ref Range    Sodium 144 135 - 145 mmol/L    Potassium 4.4 3.6 - 5.5 mmol/L    Chloride 108 96 - 112 mmol/L    Co2 25 20 - 33 mmol/L    Anion Gap 11.0 7.0 - 16.0    Glucose 123 (H) 65 - 99 mg/dL    Bun 16 8 - 22 mg/dL    Creatinine 0.76 0.50 - 1.40 mg/dL    Calcium 9.5 8.5 - 10.5 mg/dL    Correct Calcium 9.4 8.5 - 10.5 mg/dL    AST(SGOT) 24 12 - 45 U/L    ALT(SGPT) 21 2 - 50 U/L    Alkaline Phosphatase 153 (H) 30 - 99 U/L    Total Bilirubin 0.2 0.1 - 1.5 mg/dL    Albumin 4.1 3.2 - 4.9 g/dL     Total Protein 7.2 6.0 - 8.2 g/dL    Globulin 3.1 1.9 - 3.5 g/dL    A-G Ratio 1.3 g/dL   TROPONIN   Result Value Ref Range    Troponin T 10 6 - 19 ng/L   proBrain Natriuretic Peptide, NT   Result Value Ref Range    NT-proBNP 204 (H) 0 - 125 pg/mL   TSH WITH REFLEX TO FT4   Result Value Ref Range    TSH 2.640 0.380 - 5.330 uIU/mL   ESTIMATED GFR   Result Value Ref Range    GFR (CKD-EPI) 86 >60 mL/min/1.73 m 2   EKG   Result Value Ref Range    Report       [facility] Emergency Dept.    Test Date:  2023  Pt Name:    SAFIA HAUSER             Department: ER  MRN:        [id number]                      Room:       RD 10  Gender:     Female                       Technician: [id number]  :        1955                   Requested By:ER TRIAGE PROTOCOL  Order #:    [id number]                    Reading MD: Jules Dooley    Measurements  Intervals                                Axis  Rate:       114                          P:          61  NJ:         146                          QRS:        32  QRSD:       87                           T:          22  QT:         343  QTc:        473    Interpretive Statements  Sinus tachycardia  Low voltage, precordial leads  Borderline ST depression, diffuse leads  Minimal ST elevation, inferior leads  No previous ECG available for comparison  Electronically Signed On 2023 02:34:06 PST by Jules Dooley        All labs reviewed by me. Labs were compared to prior labs if they were available. Significant for no looks ptosis, no anemia, normal electrolyte, normal renal function, normal liver enzymes, bilirubin, troponin negative, BNP normal, TSH within normal limits.    RADIOLOGY  I have independently interpreted the diagnostic imaging associated with this visit and am waiting the final reading from the radiologist.   My preliminary interpretation is a follows: No focal infiltrates, no cardiomegaly  Radiologist interpretation is as  follows:  DX-CHEST-PORTABLE (1 VIEW)   Final Result         1.  No focal infiltrates.          COURSE & MEDICAL DECISION MAKING  Nursing notes, VS, PMSFHx, labs, imaging, EKG reviewed in chart.    Heart Score: Low    ED Observation Status? No; Patient does not meet criteria for ED Observation.     Ddx: Tachybradycardia syndrome, sick sinus syndrome, SVT, dysrhythmia, arrhythmia, MI, PE, dissection, AAA    MDM: 2:21 AM Grace Reid is a 68 y.o. female who presented with evaluation for suspected sick sinus syndrome/tachybradycardia syndrome.  Patient is fairly healthy for her age, does have a cardiac history of an MI with stents placed 2 years ago but is been stable and compliant with medications.  This evening she was at home around 10 PM watching TV and noticed the onset of palpitations, denies chest pain or shortness of breath, no lightheadedness or dizziness.  Her Fitbit Apple Watch said that she was in A-fib with a high heart rate of around 150 and she went to [facility] emergency department where patient was found to have varying heart rates from the mid 30s to the 170s, 1 episode of SVT as well documented by them.  Normal labs at that time and was sent here for cardiology evaluation for sick sinus syndrome.  EMS Hel provide collateral information regarding patient's presentation upon arrival here and stated she has been stable in route.  Patient initially arrives here with some mild hypertension and tachycardia in the 130s that was appreciated to be sinus, and then suddenly slowed down to around 60.  Patient remained asymptomatic during these episodes.  Her EKG here just shows sinus tachycardia.  Chest x-ray independently interpreted by myself shows no focal infiltrates or cardiomegaly, radiologist agrees. All labs reviewed by me. Labs were compared to prior labs if they were available. Significant for no looks ptosis, no anemia, normal electrolyte, normal renal function, normal liver enzymes,  bilirubin, troponin negative, BNP normal, TSH within normal limits.  Ultimately patient will be admitted to the hospitalist team to be monitored overnight and have cardiology consulted on the patient for consideration of pacemaker of placement and further investigation diagnosis of patient's sudden variable heart rate is suspected sick sinus syndrome.  She is stable at time of mission and verbalized understanding plan for admission.    2:52 AM I discussed the patient's case and the above findings with Dr. Ruffin who will admit the patient.      ADDITIONAL PROBLEM LIST AND DISPOSITION    I have discussed management of the patient with the following physicians and CLINT's:  Ronaldo Ruffin    Discussion of management with other [facility] or appropriate source(s): None     Barriers to care at this time, including but not limited to: Patient does not have established PCP.     Decision tools and prescription drugs considered including, but not limited to: HEART Score low .    FINAL IMPRESSION  1. Sick sinus syndrome (HCC) Acute   2. Hypertension, unspecified type Acute   3. Palpitations Acute      IAbi (Scribe), am scribing for, and in the presence of, Jules Dooley.    Electronically signed by: Abi Reyes (Scribe), 12/13/2023    I, Jules Dooley personally performed the services described in this documentation, as scribed by Abi Reyes in my presence, and it is both accurate and complete.    The note accurately reflects work and decisions made by me.  Jules Dooley  12/13/2023  3:10 AM

## 2023-12-13 NOTE — HOSPITAL COURSE
"rGace Reid is a 68 y.o. female w/ PMH of HTN, HLD, GERD, CAD s/p stent placement two years ago after a MI, who presented 12/13/2023 with as a transfer from outside facility for arrhythmia.    Patient initially presented to the outside facility complaining of acute onset palpitations.  According to chart review, patient was found to be SVT and was given 2 mg of magnesium, and she converted to sinus tachycardia.  At the outside facility, patient was found to be bradycardic in the 30s and tachycardic greater than 120s. Outside facility concerned for tachy-jaxson syndrome, and as a result she was transferred here for cardiology evaluation.      Patient states that she is currently experiencing some palpitations and racing heart rate, lightheadedness, dizziness, and states that she has mild discomfort substernally/epigastric when her heart races. States the pain does not radiate anywhere. Denies syncope, however she does have a few episodes where she is unsure if she \"nodded off\" or not. No prior history of atrial fibrillation. EKG in ED was sinus rhythm.  "

## 2023-12-13 NOTE — ED TRIAGE NOTES
"Chief Complaint   Patient presents with    Palpitations     Pt transferred from [facility]. Pt had heart palpitations yesterday at 2230. Pt found to be in SVT. Pt received 2mg of Magnesium and self converted to sinus tachycardia.      BP (!) 171/92   Pulse (!) 121   Temp 36.5 °C (97.7 °F) (Temporal)   Resp 16   Ht 1.549 m (5' 1\")   Wt 76.2 kg (168 lb)   SpO2 95%   BMI 31.74 kg/m²     Upon arrival, pt is A&Ox4 and denies any chest pain or dizziness. Pt noted to be tachycardic. EKG preformed.  Call light within reach, chart up for ERP   "

## 2023-12-13 NOTE — ED NOTES
Pt ambulated to and from the restroom. Pt ambulated with a steady gait. Pt back to bed with call light within reach

## 2023-12-13 NOTE — CONSULTS
"      CARDIOLOGY CONSULTATION NOTE      Date of Consultation: 12/13/2023  Consulting Provider: Jac Pang    Patient Name: Grace Reid  YOB: 1955  MRN: [id number]    Reason for Consultation:   Concern for tachybradycardia syndrome.    History of Present Illness:   Ms. Reid is a 68-year-old female with past medical history of hypertension, hyperlipidemia, and previous MI with stent placed in 2021 who initially presented to [facility] overnight for worsening episodes of palpitations.    Patient notes that 2 years ago she had a syncopal episode with significant gastrointestinal discomfort resulting in her being hospitalized where she was found to have an MI.  Notes that she had a similar episode overnight however states that the pain was distinct and that it was more associated with palpitations.  States that over the last week she is also had multiple episodes where she feels as though she is \"nodding off\" but she is uncertain whether or not these were actually episodes where she was about to pass out.  States that yesterday it came to ahead after returning home from an outing with her friends.  States that she had significant feelings of her heart racing with skipped beats as well as lightheadedness and dizziness.  Decided to come into the ED for further evaluation.  Upon arrival to the ED she had a heart rate in the 30s with variability up to the 150s to 160s.  As such, the patient was transferred to [facility] for cardiac evaluation as no cardiologist was available at [facility].    Cardiology was consulted as there was a concern for tachybradycardia syndrome and need for further evaluation.    Currently notes that she has not having any chest pain, pressure, tightness, notes that her palpitations have subsided, also notes that her gastrointestinal discomfort has also subsided.  Patient also denies any issues with no breathing, pain with inspiration, fevers, chills, recent " "illness or recent sick contacts.    Medical History:   History of MI, hypertension, hyperlipidemia, and GERD.    Surgical History:   IZZY x 1 in 2021    Family History:   Family history is noncontributory to primary complaint.    Social History:   Denies any smoking history, notes that she drinks socially without any concern for binge drinking.  Denies illicit drugs.    Medications and Allergies:     Current Facility-Administered Medications   Medication Dose Route Frequency Provider Last Rate Last Admin    senna-docusate (Pericolace Or Senokot S) 8.6-50 MG per tablet 2 Tablet  2 Tablet Oral BID Ronaldo Ruffin M.D.        And    polyethylene glycol/lytes (Miralax) Packet 1 Packet  1 Packet Oral QDAY PRN Ronaldo Ruffin M.D.        And    magnesium hydroxide (Milk Of Magnesia) suspension 30 mL  30 mL Oral QDAY PRN Ronaldo Ruffin M.D.        And    bisacodyl (Dulcolax) suppository 10 mg  10 mg Rectal QDAY PRN Ronaldo Ruffin M.D.        enoxaparin (Lovenox) inj 40 mg  40 mg Subcutaneous DAILY AT 1800 Ronaldo Ruffin M.D.        acetaminophen (Tylenol) tablet 650 mg  650 mg Oral Q6HRS PRN Ronaldo Ruffin M.D.        [Held by provider] carvedilol (Coreg) tablet 3.125 mg  3.125 mg Oral BID WITH MEALS Ronaldo Ruffin M.D.   3.125 mg at 12/13/23 0722    lisinopril (Prinivil) tablet 5 mg  5 mg Oral Q DAY Ronaldo Ruffin M.D.   5 mg at 12/13/23 0639    atorvastatin (Lipitor) tablet 80 mg  80 mg Oral Q EVENING Ronaldo Ruffin M.D.         Current Outpatient Medications   Medication Sig Dispense Refill    atorvastatin (LIPITOR) 80 MG tablet Take 80 mg by mouth every evening.      carvedilol (COREG) 3.125 MG Tab Take 3.125 mg by mouth 2 times a day with meals.      lisinopril (PRINIVIL) 5 MG Tab Take 5 mg by mouth every evening.      Non Formulary Request Take 1 Tablet by mouth every day. \"Tudca-liver supplement\"      Non Formulary Request Take 1 Tablet by mouth every day. \"Dr Horacio ivan for Gallbladder OTC\"      aspirin 81 MG " "EC tablet Take 81 mg by mouth every evening.      multivitamin Tab Take 1 Tablet by mouth every day.      Miscellaneous Vaginal Products ([facility]) Insert 1 Applicatorful into the vagina every day.      Cholecalciferol (D 1000) 1000 UNIT Cap Take 1,000 Units by mouth every day.      Coenzyme Q10 100 MG Cap Take 100 mg by mouth every day.      Omega 3 1000 MG Cap Take 1 Capsule by mouth every day.         Allergies   Allergen Reactions    Tetracycline Unspecified     Upset stomach         Review of Systems:   A pertinent review of systems was performed and was unremarkable except as per HPI above.    Vital Signs:   BP (!) 160/79   Pulse 65   Temp 36.5 °C (97.7 °F) (Temporal)   Resp 18   Ht 1.549 m (5' 1\")   Wt 76.2 kg (168 lb)   SpO2 98%   BMI 31.74 kg/m²   Vitals:    12/13/23 0903 12/13/23 0930 12/13/23 1000 12/13/23 1030   BP: (!) 147/73  (!) 160/79    Pulse: 70 78 72 65   Resp: 12 19 14 18   Temp:       TempSrc:       SpO2: 93% 96% 94% 98%   Weight:       Height:         Body mass index is 31.74 kg/m².  Oxygen Therapy:  Pulse Oximetry: 98 %, O2 Delivery Device: None - Room Air    Physical Examination:   Physical Exam  Vitals and nursing note reviewed.   Constitutional:       General: She is not in acute distress.     Appearance: She is obese. She is not diaphoretic.   HENT:      Head: Normocephalic and atraumatic.      Right Ear: External ear normal.      Left Ear: External ear normal.      Nose: Nose normal.      Mouth/Throat:      Mouth: Mucous membranes are moist.      Pharynx: Oropharynx is clear.   Eyes:      General: No scleral icterus.     Extraocular Movements: Extraocular movements intact.      Pupils: Pupils are equal, round, and reactive to light.   Neck:      Vascular: No carotid bruit.   Cardiovascular:      Rate and Rhythm: Normal rate. Rhythm irregular.      Pulses: Normal pulses.      Heart sounds: Normal heart sounds. No murmur heard.  Pulmonary:      Effort: Pulmonary effort is " normal. No respiratory distress.      Breath sounds: Normal breath sounds.   Chest:      Chest wall: No tenderness.   Abdominal:      General: Abdomen is flat. Bowel sounds are normal. There is no distension.      Tenderness: There is no right CVA tenderness, left CVA tenderness or rebound.   Musculoskeletal:         General: No swelling. Normal range of motion.      Cervical back: Normal range of motion. No rigidity.      Right lower leg: Edema present.      Left lower leg: Edema present.   Skin:     General: Skin is warm and dry.      Capillary Refill: Capillary refill takes less than 2 seconds.      Coloration: Skin is not jaundiced.      Findings: No rash.   Neurological:      General: No focal deficit present.      Mental Status: She is alert and oriented to person, place, and time. Mental status is at baseline.      Cranial Nerves: No cranial nerve deficit.      Deep Tendon Reflexes: Reflexes normal.   Psychiatric:         Mood and Affect: Mood normal.         Behavior: Behavior normal.       Laboratories:   Estimated Creatinine Clearance: 68 mL/min (by C-G formula based on SCr of 0.76 mg/dL).  Recent Labs     12/13/23  0231   CREATININE 0.76   BUN 16   POTASSIUM 4.4   SODIUM 144   CALCIUM 9.5   CO2 25   ALBUMIN 4.1     Recent Labs     12/13/23  0231   GLUCOSE 123*     Recent Labs     12/13/23  0231   ASTSGOT 24   ALTSGPT 21   ALKPHOSPHAT 153*     Recent Labs     12/13/23  0231   WBC 9.5   HEMOGLOBIN 14.8   PLATELETCT 301     Recent Labs     12/13/23  0231 12/13/23  0944   TROPONINT 10 8   NTPROBNP 204*  --      Lab Results   Component Value Date/Time     (H) 12/13/2023 0231     Lab Results   Component Value Date/Time    HDL 69 12/13/2023 0231       Lab Results   Component Value Date/Time    TRIGLYCERIDE 74 12/13/2023 0231       Lab Results   Component Value Date/Time    CHOLSTRLTOT 184 12/13/2023 0231       Studies:   Echocardiography  Unremarkable echo performed on  12/13/2023.    Assessment/Recommendations:   # PAC's with symptomatic bradycardia  # Syncope and collapse  # History of MI in 2021 with IZZY placement  #Hypertension  Concerning for blocked PACs contributing to bradycardia.  No concern for tachybradycardia syndrome as the patient does not appear to be in atrial fibrillation.  No recent illnesses reported per patient.  - Patient should be admitted for telemetry monitoring.  - Will discontinue Coreg in favor of metoprolol succinate 50 mg daily for more selective beta-blockade.  - Management of hypertension per primary team.  - Echocardiogram does not appear to be contributory to patient's primary complaint.    Thank you for this consult. The assessment and plan were discussed with my attending Dr. Graham Zayas. Please feel free to reach out via Voalte, if you have any further questions or concerns.     Cardiology to follow.     Unique Clark DO, MPH  PGY-3 Internal Medicine

## 2023-12-14 ENCOUNTER — PATIENT OUTREACH (OUTPATIENT)
Dept: SCHEDULING | Facility: IMAGING CENTER | Age: 68
End: 2023-12-14
Payer: MEDICARE

## 2023-12-14 ENCOUNTER — PHARMACY VISIT (OUTPATIENT)
Dept: PHARMACY | Facility: MEDICAL CENTER | Age: 68
End: 2023-12-14
Payer: COMMERCIAL

## 2023-12-14 VITALS
TEMPERATURE: 97.7 F | BODY MASS INDEX: 32.34 KG/M2 | WEIGHT: 171.3 LBS | RESPIRATION RATE: 16 BRPM | HEART RATE: 65 BPM | DIASTOLIC BLOOD PRESSURE: 75 MMHG | OXYGEN SATURATION: 95 % | SYSTOLIC BLOOD PRESSURE: 120 MMHG | HEIGHT: 61 IN

## 2023-12-14 LAB
ANION GAP SERPL CALC-SCNC: 11 MMOL/L (ref 7–16)
BUN SERPL-MCNC: 15 MG/DL (ref 8–22)
CALCIUM SERPL-MCNC: 9.1 MG/DL (ref 8.5–10.5)
CHLORIDE SERPL-SCNC: 105 MMOL/L (ref 96–112)
CO2 SERPL-SCNC: 23 MMOL/L (ref 20–33)
CREAT SERPL-MCNC: 0.89 MG/DL (ref 0.5–1.4)
ERYTHROCYTE [DISTWIDTH] IN BLOOD BY AUTOMATED COUNT: 45.8 FL (ref 35.9–50)
GFR SERPLBLD CREATININE-BSD FMLA CKD-EPI: 71 ML/MIN/1.73 M 2
GLUCOSE BLD STRIP.AUTO-MCNC: 89 MG/DL (ref 65–99)
GLUCOSE SERPL-MCNC: 104 MG/DL (ref 65–99)
HCT VFR BLD AUTO: 44.1 % (ref 37–47)
HGB BLD-MCNC: 14.2 G/DL (ref 12–16)
MAGNESIUM SERPL-MCNC: 2.2 MG/DL (ref 1.5–2.5)
MCH RBC QN AUTO: 29.2 PG (ref 27–33)
MCHC RBC AUTO-ENTMCNC: 32.2 G/DL (ref 32.2–35.5)
MCV RBC AUTO: 90.6 FL (ref 81.4–97.8)
PLATELET # BLD AUTO: 275 K/UL (ref 164–446)
PMV BLD AUTO: 9.5 FL (ref 9–12.9)
POTASSIUM SERPL-SCNC: 3.9 MMOL/L (ref 3.6–5.5)
RBC # BLD AUTO: 4.87 M/UL (ref 4.2–5.4)
SODIUM SERPL-SCNC: 139 MMOL/L (ref 135–145)
WBC # BLD AUTO: 7.9 K/UL (ref 4.8–10.8)

## 2023-12-14 PROCEDURE — 700102 HCHG RX REV CODE 250 W/ 637 OVERRIDE(OP): Performed by: STUDENT IN AN ORGANIZED HEALTH CARE EDUCATION/TRAINING PROGRAM

## 2023-12-14 PROCEDURE — RXMED WILLOW AMBULATORY MEDICATION CHARGE: Performed by: INTERNAL MEDICINE

## 2023-12-14 PROCEDURE — 82962 GLUCOSE BLOOD TEST: CPT

## 2023-12-14 PROCEDURE — 80048 BASIC METABOLIC PNL TOTAL CA: CPT

## 2023-12-14 PROCEDURE — 99232 SBSQ HOSP IP/OBS MODERATE 35: CPT | Mod: GC | Performed by: INTERNAL MEDICINE

## 2023-12-14 PROCEDURE — 85027 COMPLETE CBC AUTOMATED: CPT

## 2023-12-14 PROCEDURE — A9270 NON-COVERED ITEM OR SERVICE: HCPCS | Performed by: INTERNAL MEDICINE

## 2023-12-14 PROCEDURE — 700102 HCHG RX REV CODE 250 W/ 637 OVERRIDE(OP): Performed by: INTERNAL MEDICINE

## 2023-12-14 PROCEDURE — 99239 HOSP IP/OBS DSCHRG MGMT >30: CPT | Performed by: INTERNAL MEDICINE

## 2023-12-14 PROCEDURE — 83735 ASSAY OF MAGNESIUM: CPT

## 2023-12-14 PROCEDURE — A9270 NON-COVERED ITEM OR SERVICE: HCPCS | Performed by: STUDENT IN AN ORGANIZED HEALTH CARE EDUCATION/TRAINING PROGRAM

## 2023-12-14 RX ORDER — METOPROLOL SUCCINATE 50 MG/1
50 TABLET, EXTENDED RELEASE ORAL 2 TIMES DAILY
Qty: 30 TABLET | Refills: 0 | Status: SHIPPED | OUTPATIENT
Start: 2023-12-14 | End: 2023-12-14

## 2023-12-14 RX ORDER — METOPROLOL SUCCINATE 50 MG/1
50 TABLET, EXTENDED RELEASE ORAL 2 TIMES DAILY
Qty: 30 TABLET | Refills: 0 | Status: SHIPPED | OUTPATIENT
Start: 2023-12-14

## 2023-12-14 RX ADMIN — LISINOPRIL 5 MG: 10 TABLET ORAL at 05:19

## 2023-12-14 RX ADMIN — ASPIRIN 81 MG: 81 TABLET, COATED ORAL at 05:19

## 2023-12-14 RX ADMIN — METOPROLOL SUCCINATE 50 MG: 50 TABLET, EXTENDED RELEASE ORAL at 05:19

## 2023-12-14 ASSESSMENT — FIBROSIS 4 INDEX: FIB4 SCORE: 1.26

## 2023-12-14 NOTE — DISCHARGE INSTRUCTIONS
Discharge Instructions per Prasanna Olivarez M.D.    DIAGNOSIS: Your echocardiogram was normal. Cardiology reviewed your heart rhythm and appears you have intermittent skipped beats that resulted in irregular rhythm and suggests you change your medication carvedilol to metoprolol. Please stop lisinopril as the metoprolol lowers your blood pressure. Please see your primary care doctor or cardiologist in 2 weeks.

## 2023-12-14 NOTE — DISCHARGE SUMMARY
Discharge Summary    CHIEF COMPLAINT ON ADMISSION  Chief Complaint   Patient presents with    Palpitations     Pt transferred from [facility]. Pt had heart palpitations yesterday at 2230. Pt found to be in SVT. Pt received 2mg of Magnesium and self converted to sinus tachycardia.        Reason for Admission  EMS     Admission Date  12/13/2023    CODE STATUS  Full    HPI & HOSPITAL COURSE  67 yo woman with CAD with stenting, HTN, HLD who presented in [facility] with palpitations. She was found in SVT and converted to sinus after given 2g Mg, she then had possible tachybrady syndrome and transferred to [facility]. TTE was unremarkable. Cardiology was consulted, thought to have bradycardia with blocked PACs intermittently and recommended metoprolol. Her losartan was stopped for lower BP on metoprolol.     Therefore, she is discharged in good and stable condition to home with close outpatient follow-up.    The patient recovered much more quickly than anticipated on admission.    Discharge Date  12/14/2023    FOLLOW UP ITEMS POST DISCHARGE  Monitor HTN and HR, new metop and discontinued losartan    DISCHARGE DIAGNOSES  Principal Problem:    Bradycardia (POA: Yes)  Active Problems:    Primary hypertension (POA: Unknown)    Hyperlipidemia (POA: Unknown)  Resolved Problems:    * No resolved hospital problems. *      FOLLOW UP  Moi Haas M.D.  [address] [facility] [facility] [id number] [phone]    Go on 2/1/2024  Please go to your follow up appointment with Moi Haas M.D. on Thursday February 1, 2024 at 10:00am.    PCP    Go to  Previously scheduled appointment on Jan 4 with new PCP      MEDICATIONS ON DISCHARGE     Medication List        START taking these medications        Instructions   metoprolol SR 50 MG Tb24  Commonly known as: Toprol XL   Take 1 Tablet by mouth 2 times a day.  Dose: 50 mg            CONTINUE taking these medications        Instructions   aspirin 81 MG EC tablet   Take 81 mg by  "mouth every evening.  Dose: 81 mg     atorvastatin 80 MG tablet  Commonly known as: Lipitor   Take 80 mg by mouth every evening.  Dose: 80 mg     Coenzyme Q10 100 MG Caps   Take 100 mg by mouth every day.  Dose: 100 mg     D 1000 1000 UNIT Caps  Generic drug: Cholecalciferol   Take 1,000 Units by mouth every day.  Dose: 1,000 Units     MONISTAT CARE VA   Insert 1 Applicatorful into the vagina every day.  Dose: 1 Applicatorful     multivitamin Tabs   Take 1 Tablet by mouth every day.  Dose: 1 Tablet     Non Formulary Request   Take 1 Tablet by mouth every day. \"Tudca-liver supplement\"  Dose: 1 Tablet     Non Formulary Request   Take 1 Tablet by mouth every day. \"Dr Horacio ivan for Gallbladder OTC\"  Dose: 1 Tablet     Omega 3 1000 MG Caps   Take 1 Capsule by mouth every day.  Dose: 1 Capsule            STOP taking these medications      carvedilol 3.125 MG Tabs  Commonly known as: Coreg     lisinopril 5 MG Tabs  Commonly known as: Prinivil              Allergies  Allergies   Allergen Reactions    Tetracycline Unspecified     Upset stomach         DIET  No orders of the defined types were placed in this encounter.      ACTIVITY  As tolerated.    CONSULTATIONS  Dr. Zayas    PROCEDURES  EC-ECHOCARDIOGRAM COMPLETE W/O CONT   Final Result      DX-CHEST-PORTABLE (1 VIEW)   Final Result         1.  No focal infiltrates.            LABORATORY  Lab Results   Component Value Date    SODIUM 139 12/14/2023    POTASSIUM 3.9 12/14/2023    CHLORIDE 105 12/14/2023    CO2 23 12/14/2023    GLUCOSE 104 (H) 12/14/2023    BUN 15 12/14/2023    CREATININE 0.89 12/14/2023        Lab Results   Component Value Date    WBC 7.9 12/14/2023    HEMOGLOBIN 14.2 12/14/2023    HEMATOCRIT 44.1 12/14/2023    PLATELETCT 275 12/14/2023        Total time of the discharge process exceeds 32 minutes.  "

## 2023-12-14 NOTE — PROGRESS NOTES
Patient is being wheeled down to discharge [facility] with a care aide. Patient has all personal belongings in possession. [email or web address] notified that patient will be discharging from the [facility]

## 2023-12-14 NOTE — PROGRESS NOTES
"      CARDIOLOGY PROGRESS NOTE      Date: 12/14/2023      Patient Name: Grace Reid  YOB: 1955  MRN: [id number]    HPI/Events/Subjective:   Ms. Reid is a 68-year-old female with past medical history of hypertension, hyperlipidemia, and previous MI with stent placed in 2021 who initially presented to [facility] overnight for worsening episodes of palpitations.     Patient notes that 2 years ago she had a syncopal episode with significant gastrointestinal discomfort resulting in her being hospitalized where she was found to have an MI.  Notes that she had a similar episode overnight however states that the pain was distinct and that it was more associated with palpitations.  States that over the last week she is also had multiple episodes where she feels as though she is \"nodding off\" but she is uncertain whether or not these were actually episodes where she was about to pass out.  States that yesterday it came to ahead after returning home from an outing with her friends.  States that she had significant feelings of her heart racing with skipped beats as well as lightheadedness and dizziness.  Decided to come into the ED for further evaluation.  Upon arrival to the ED she had a heart rate in the 30s with variability up to the 150s to 160s.  As such, the patient was transferred to [facility] for cardiac evaluation as no cardiologist was available at [facility].     Cardiology was consulted as there was a concern for tachybradycardia syndrome and need for further evaluation.    Interval History:  12/14/2023 - Patient is doing well. Denies any further chest discomfort or lightheadedness. States she was able to walk without issues or presyncopal symptoms. Blood pressure is significantly improved and heart rate has been stable overnight.     Medications:     Scheduled Medications   Medication Dose Frequency    senna-docusate  2 Tablet BID    enoxaparin (LOVENOX) injection  40 mg DAILY AT 1800    " "[Held by provider] lisinopril  5 mg Q DAY    atorvastatin  80 mg Q EVENING    metoprolol SR  50 mg BID    aspirin  81 mg DAILY     Current Outpatient Medications   Medication Instructions    aspirin 81 mg, Oral, EVERY EVENING    atorvastatin (LIPITOR) 80 mg, Oral, EVERY EVENING    Coenzyme Q10 100 mg, Oral, DAILY    D 1000 1,000 Units, Oral, DAILY    Miscellaneous Vaginal Products ([facility]) 1 Applicatorful, Vaginal, Every Day (QD)    multivitamin Tab 1 Tablet, Oral, DAILY    Non Formulary Request 1 Tablet, Oral, Every Day (QD), \"Tudca-liver supplement\"    Non Formulary Request 1 Tablet, Oral, Every Day (QD), \"Dr Horacio ivan for Gallbladder OTC\"    Omega 3 1000 MG Cap 1 Capsule, Oral, DAILY       Vitals:   BP 98/62   Pulse 60   Temp 36.5 °C (97.7 °F) (Temporal)   Resp 16   Ht 1.549 m (5' 1\")   Wt 77.7 kg (171 lb 4.8 oz)   SpO2 93%    BP Readings from Last 4 Encounters:   12/14/23 98/62     Wt Readings from Last 4 Encounters:   12/14/23 77.7 kg (171 lb 4.8 oz)     Body mass index is 32.37 kg/m².    Physical Examination:   Physical Exam  Vitals and nursing note reviewed.   Constitutional:       General: She is not in acute distress.     Appearance: She is obese. She is not diaphoretic.   HENT:      Head: Normocephalic and atraumatic.      Right Ear: External ear normal.      Left Ear: External ear normal.      Nose: Nose normal.      Mouth/Throat:      Mouth: Mucous membranes are moist.      Pharynx: Oropharynx is clear.   Eyes:      General: No scleral icterus.     Extraocular Movements: Extraocular movements intact.      Pupils: Pupils are equal, round, and reactive to light.   Neck:      Vascular: No carotid bruit.   Cardiovascular:      Rate and Rhythm: Normal rate and regular rhythm.      Pulses: Normal pulses.      Heart sounds: Normal heart sounds. No murmur heard.  Pulmonary:      Effort: Pulmonary effort is normal. No respiratory distress.      Breath sounds: Normal breath sounds. No wheezing or " rales.   Chest:      Chest wall: No tenderness.   Abdominal:      General: Abdomen is flat. Bowel sounds are normal. There is no distension.      Tenderness: There is no right CVA tenderness, left CVA tenderness or rebound.   Musculoskeletal:         General: No swelling. Normal range of motion.      Cervical back: Normal range of motion. No rigidity.      Right lower leg: No edema.      Left lower leg: No edema.   Skin:     General: Skin is warm and dry.      Capillary Refill: Capillary refill takes less than 2 seconds.      Coloration: Skin is not jaundiced.      Findings: No rash.   Neurological:      General: No focal deficit present.      Mental Status: She is alert and oriented to person, place, and time. Mental status is at baseline.      Cranial Nerves: No cranial nerve deficit.      Deep Tendon Reflexes: Reflexes normal.   Psychiatric:         Mood and Affect: Mood normal.         Behavior: Behavior normal.       Laboratories:   Estimated Creatinine Clearance: 58.7 mL/min (by C-G formula based on SCr of 0.89 mg/dL).  Recent Labs     12/13/23 0231 12/14/23  0145   CREATININE 0.76 0.89   BUN 16 15   POTASSIUM 4.4 3.9   SODIUM 144 139   CALCIUM 9.5 9.1   MAGNESIUM  --  2.2   CO2 25 23   ALBUMIN 4.1  --      Recent Labs     12/13/23 0231 12/14/23  0145   GLUCOSE 123* 104*     Recent Labs     12/13/23 0231   ASTSGOT 24   ALTSGPT 21   ALKPHOSPHAT 153*     Recent Labs     12/13/23  0231 12/14/23  0145   WBC 9.5 7.9   HEMOGLOBIN 14.8 14.2   PLATELETCT 301 275     Recent Labs     12/13/23  0231 12/13/23  0944   TROPONINT 10 8   NTPROBNP 204*  --      Lab Results   Component Value Date/Time     (H) 12/13/2023 0231     Lab Results   Component Value Date/Time    HDL 69 12/13/2023 0231       Lab Results   Component Value Date/Time    TRIGLYCERIDE 74 12/13/2023 0231       Lab Results   Component Value Date/Time    CHOLSTRLTOT 184 12/13/2023 0231       Telemetry:   SR in the 50's to 70's overnight. No  significant episodes of bradycardia or tachycardia.     Cardiac Studies and Procedures:   Echocardiography  Unremarkable echo, EF 65%, no significant valvular abnormalities.     Assessment/Recommendations:   # Sinus Arrhthmia  # Syncope and collapse  # History of MI in 2021 with IZZY placement  # Hypertension  # Dyslipidemia  Concerning for blocked PACs contributing to bradycardia. No concern for tachybradycardia syndrome as the patient does not appear to be in atrial fibrillation.  No recent illnesses reported per patient.  - Continue metoprolol succinate 50 mg twice daily.   - Discontinue carvedilol and lisinopril.   - Will need close follow up with primary care and outpatient cardiology.   - Management of hypertension per primary team.     Thank you for this consult. The assessment and plan were discussed with my attending Dr. Graham Zayas. Please feel free to reach out via Voalte, if you have any further questions or concerns.      Cardiology to sign off.     Unique Clark DO, MPH  PGY-3 Internal Medicine

## 2023-12-14 NOTE — PROGRESS NOTES
Bedside report received from night shift RN. Assumed care at 0700. Pt is A&Ox4. Pt is in bed. Pt denies pain at this time. Pt was updated on plan of care. Pt has call light, personal belongings, and bedside table within reach. Bed is in the lowest position.

## 2023-12-14 NOTE — CARE PLAN
The patient is Stable - Low risk of patient condition declining or worsening    Shift Goals  Clinical Goals: Monitor HR, VS, Maintain hemodynamics  Patient Goals: sleep, go home  Family Goals: na    Progress made toward(s) clinical / shift goals:    Problem: Communication  Goal: The ability to communicate needs accurately and effectively will improve  Outcome: Progressing     Problem: Hemodynamics  Goal: Patient's hemodynamics, fluid balance and neurologic status will be stable or improve  Outcome: Progressing       Patient is not progressing towards the following goals:

## 2023-12-14 NOTE — PROGRESS NOTES
Grace Reid has been provided discharge instructions, to include follow up care, home medications, and activity/diet reviewed. Understanding verbalized. IV removed prior to arrival at [facility]. Arm band removed. Medications given from pharmacy. Pt ride present. Pt out of [facility] at this time with personal belongings.

## 2023-12-14 NOTE — PROGRESS NOTES
4 Eyes Skin Assessment Completed by WILSON Lockett and YUNIOR Alford.    Head WDL  Ears WDL  Nose WDL  Mouth WDL  Neck WDL  Breast/Chest Redness and Blanching  Shoulder Blades WDL  Spine WDL  (R) Arm/Elbow/Hand WDL  (L) Arm/Elbow/Hand WDL  Abdomen WDL  Groin WDL  Scrotum/Coccyx/Buttocks Redness and Blanching  (R) Leg Redness and Swelling  (L) Leg Redness and Swelling  (R) Heel/Foot/Toe Redness and Blanching  (L) Heel/Foot/Toe Redness and Blanching          Devices In Places Tele Box and Pulse Ox      Interventions In Place Pillows    Possible Skin Injury No    Pictures Uploaded Into Epic N/A  Wound Consult Placed N/A  RN Wound Prevention Protocol Ordered No

## 2024-02-01 ENCOUNTER — HOSPITAL ENCOUNTER (OUTPATIENT)
Dept: CARDIOLOGY | Facility: MEDICAL CENTER | Age: 69
End: 2024-02-01
Attending: PHYSICIAN ASSISTANT
Payer: MEDICARE

## 2024-02-01 DIAGNOSIS — R10.9 STOMACH ACHE: ICD-10-CM

## 2024-02-01 DIAGNOSIS — I20.89 ANGINA DECUBITUS (HCC): ICD-10-CM

## 2024-02-01 PROCEDURE — 93017 CV STRESS TEST TRACING ONLY: CPT

## 2024-02-02 LAB — LV EJECT FRACT  99904: 60

## 2024-02-02 PROCEDURE — 93350 STRESS TTE ONLY: CPT | Mod: 26 | Performed by: INTERNAL MEDICINE

## 2024-02-02 PROCEDURE — 93018 CV STRESS TEST I&R ONLY: CPT | Performed by: INTERNAL MEDICINE

## 2024-03-13 ENCOUNTER — OFFICE VISIT (OUTPATIENT)
Dept: DERMATOLOGY | Facility: IMAGING CENTER | Age: 69
End: 2024-03-13
Payer: MEDICARE

## 2024-03-13 DIAGNOSIS — L82.1 SK (SEBORRHEIC KERATOSIS): ICD-10-CM

## 2024-03-13 DIAGNOSIS — L82.0 INFLAMED SEBORRHEIC KERATOSIS: ICD-10-CM

## 2024-03-13 DIAGNOSIS — D22.9 MULTIPLE NEVI: ICD-10-CM

## 2024-03-13 PROCEDURE — 99203 OFFICE O/P NEW LOW 30 MIN: CPT | Mod: 25 | Performed by: NURSE PRACTITIONER

## 2024-03-13 PROCEDURE — 17110 DESTRUCTION B9 LES UP TO 14: CPT | Performed by: NURSE PRACTITIONER

## 2024-03-13 RX ORDER — CARVEDILOL 3.12 MG/1
3.12 TABLET ORAL
COMMUNITY
Start: 2023-12-28

## 2024-03-13 RX ORDER — LISINOPRIL 10 MG/1
TABLET ORAL
COMMUNITY
Start: 2023-12-28

## 2024-03-13 NOTE — PROGRESS NOTES
DERMATOLOGY NOTE  NEW VISIT       Chief complaint: Establish Care and Skin Lesion (Back)     HPI/location: Back  Time present: Approx 1yr  Painful lesion:  Feels like a wound  Itching lesion: No  Enlarging lesion:  Its getting smaller      History of skin cancer: No  History of precancers/actinic keratoses: No  History of biopsies:No  History of blistering/severe sunburns:Yes, Details:    Family history of skin cancer:Yes, Details: Mother unknown type  Family history of atypical moles:No      Allergies   Allergen Reactions    Tetracycline Unspecified     Upset stomach          MEDICATIONS:  Medications relevant to specialty reviewed.     REVIEW OF SYSTEMS:   Positive for skin (see HPI)  Negative for fevers and chills       EXAM:  There were no vitals taken for this visit.  Constitutional: Well-developed, well-nourished, and in no distress.     A focused skin exam was performed including the affected areas of the back. Notable findings on exam today listed below and/or in assessment/plan.     Inflamed waxy stuck on appearing papule to L lower back  Several tan light brown stuck-on waxy papules scattered on the trunk and extremities  Multiple light brown medium brown skin-colored macules papules scattered over the trunk, face and extremities    IMPRESSION / PLAN:    1. Inflamed seborrheic keratosis  CRYOTHERAPY:  Risks (including, but not limited to: skin discoloration, redness, blister, blood blister, recurrence, need for further treatment, infection, scar) and benefits of cryotherapy discussed. Patient verbally agreed to proceed with treatment. 1 cryotherapy freeze thaw cycles of 10 seconds were applied to 1 lesion on back as noted on exam with cryac. Patient tolerated procedure well. Aftercare instructions given--no specific care needed unless irritated during healing process, can apply Vaseline with small band-aid if needed.      2. SK (seborrheic keratosis)  - Benign-appearing nature of lesions discussed during  exam.   - Advised to continue to monitor for any return to clinic for new or concerning changes.      3. Multiple nevi  - Benign-appearing nature of lesions discussed during exam.   - Advised to continue to monitor for any return to clinic for new or concerning changes.        Discussed risks associated with LN2, Patient verbalized understanding and agrees with plan regarding the above          Please note that this dictation was created using voice recognition software. I have made every reasonable attempt to correct obvious errors, but I expect that there are errors of grammar and possibly content that I did not discover before finalizing the note.      Return to clinic in: Return for PRN. and as needed for any new or changing skin lesions.